# Patient Record
Sex: MALE | Race: WHITE | NOT HISPANIC OR LATINO | Employment: OTHER | URBAN - METROPOLITAN AREA
[De-identification: names, ages, dates, MRNs, and addresses within clinical notes are randomized per-mention and may not be internally consistent; named-entity substitution may affect disease eponyms.]

---

## 2023-05-23 ENCOUNTER — OFFICE VISIT (OUTPATIENT)
Dept: PRIMARY CARE | Facility: CLINIC | Age: 73
End: 2023-05-23
Payer: MEDICARE

## 2023-05-23 VITALS
HEART RATE: 93 BPM | DIASTOLIC BLOOD PRESSURE: 92 MMHG | WEIGHT: 184 LBS | OXYGEN SATURATION: 97 % | BODY MASS INDEX: 24.39 KG/M2 | HEIGHT: 73 IN | SYSTOLIC BLOOD PRESSURE: 150 MMHG | TEMPERATURE: 98 F

## 2023-05-23 DIAGNOSIS — E53.8 VITAMIN B12 DEFICIENCY: ICD-10-CM

## 2023-05-23 DIAGNOSIS — M75.41 IMPINGEMENT SYNDROME, SHOULDER, RIGHT: ICD-10-CM

## 2023-05-23 DIAGNOSIS — K50.919 CROHN'S DISEASE WITH COMPLICATION, UNSPECIFIED GASTROINTESTINAL TRACT LOCATION (MULTI): ICD-10-CM

## 2023-05-23 DIAGNOSIS — E78.9 BORDERLINE HIGH CHOLESTEROL: ICD-10-CM

## 2023-05-23 DIAGNOSIS — Z00.00 MEDICARE ANNUAL WELLNESS VISIT, SUBSEQUENT: Primary | ICD-10-CM

## 2023-05-23 PROBLEM — K50.90 CROHN DISEASE (MULTI): Status: ACTIVE | Noted: 2023-05-23

## 2023-05-23 PROCEDURE — 1170F FXNL STATUS ASSESSED: CPT | Performed by: FAMILY MEDICINE

## 2023-05-23 PROCEDURE — 1159F MED LIST DOCD IN RCRD: CPT | Performed by: FAMILY MEDICINE

## 2023-05-23 PROCEDURE — 99214 OFFICE O/P EST MOD 30 MIN: CPT | Performed by: FAMILY MEDICINE

## 2023-05-23 PROCEDURE — 1160F RVW MEDS BY RX/DR IN RCRD: CPT | Performed by: FAMILY MEDICINE

## 2023-05-23 PROCEDURE — G0439 PPPS, SUBSEQ VISIT: HCPCS | Performed by: FAMILY MEDICINE

## 2023-05-23 PROCEDURE — 1157F ADVNC CARE PLAN IN RCRD: CPT | Performed by: FAMILY MEDICINE

## 2023-05-23 PROCEDURE — 1036F TOBACCO NON-USER: CPT | Performed by: FAMILY MEDICINE

## 2023-05-23 RX ORDER — CYANOCOBALAMIN 1000 UG/ML
INJECTION, SOLUTION INTRAMUSCULAR; SUBCUTANEOUS
COMMUNITY
Start: 2022-05-24

## 2023-05-23 RX ORDER — MULTIVITAMIN
TABLET ORAL
COMMUNITY

## 2023-05-23 RX ORDER — GLUCOSAM/CHONDRO/HERB 149/HYAL 750-100 MG
TABLET ORAL
COMMUNITY

## 2023-05-23 ASSESSMENT — ACTIVITIES OF DAILY LIVING (ADL)
DOING_HOUSEWORK: INDEPENDENT
TAKING_MEDICATION: INDEPENDENT
DRESSING: INDEPENDENT
GROCERY_SHOPPING: INDEPENDENT
MANAGING_FINANCES: INDEPENDENT
BATHING: INDEPENDENT

## 2023-05-23 ASSESSMENT — PATIENT HEALTH QUESTIONNAIRE - PHQ9
SUM OF ALL RESPONSES TO PHQ9 QUESTIONS 1 AND 2: 0
1. LITTLE INTEREST OR PLEASURE IN DOING THINGS: NOT AT ALL
2. FEELING DOWN, DEPRESSED OR HOPELESS: NOT AT ALL

## 2023-05-23 NOTE — PROGRESS NOTES
"Subjective   Reason for Visit: Shayne Walton is an 72 y.o. male here for a Medicare Wellness visit.       HPI  Still having issues  with Shoulder after clearing tree. 2 months ago had a tree and fell in the driveway. 1 week later had pain in the right sholder toward the neck.  Worse in the AM when he wakes.  General activity loosens up.  Has been bothersome.  Swimming stillhurts.  He has taken advil and biofreeeze.  Ice made worsen      Patient Care Team:  Sim Mayberry DO as PCP - General  Sim Mayberry DO as PCP - [employer] PCP     Review of Systems    Objective   Vitals:  BP (!) 150/92   Pulse 93   Temp 36.7 °C (98 °F)   Ht 1.854 m (6' 1\")   Wt 83.5 kg (184 lb)   SpO2 97%   BMI 24.28 kg/m²       Physical Exam  General: Patient is alert and oriented ×3 and appears in no acute distress. No respiratory distress.    Head: Atraumatic normocephalic.    Eyes: EOMI, PERRLA      Ears: Canals patent without any irritation, tympanic membranes without inflammation, no swelling, normal light reflex.    Nose: Nares patent. Turbinates are not swollen. No discharge.    Mouth: Normal mucosa. Moist. No erythema, exudates, tonsillar enlargement.    Neck: Normal range of motion, no masses.  Thyroid is palpable and normal in size without any nodules. No anterior cervical or posterior cervical adenopathy.    Heart: Regular rate and rhythm, no murmurs clicks or gallops    Lungs: Clear to auscultation bilaterally without any rhonchi rales or wheezing, lung sounds heard throughout all lung fields    Abdomen: Soft, nontender, no rigidity, rebound, guarding or organomegaly. Bowel sounds ×4 quadrants.    Musculoskeletal: Normal range of motion, strength is grossly intact in the proximal distal muscles of the upper and lower extremities bilaterally, deep tendon reflexes +2 out of 4 and symmetric bilaterally at the patella, Achilles, biceps, triceps, sensation intact.  Negative empty can test, positive Hernandez sign, " negative Lincoln's test    Nerves: Cranial nerves II through XII appear grossly intact and without deficit    Skin: Intact, dry, no rashes or erythema    Psych: Normal affect.    Assessment/Plan   Problem List Items Addressed This Visit       Crohn disease (CMS/[facility])    Relevant Orders    CBC and Auto Differential    Comprehensive Metabolic Panel    Lipid Panel    Vitamin B12    Vitamin B12 deficiency    Relevant Orders    CBC and Auto Differential    Comprehensive Metabolic Panel    Lipid Panel    Vitamin B12     Other Visit Diagnoses       Medicare annual wellness visit, subsequent    -  Primary    Borderline high cholesterol        Relevant Orders    Lipid Panel    Impingement syndrome, shoulder, right            Patient was given exercises for the shoulder, discussed icing and rest.  If no improvement over the next 4 to 6 weeks we could do joint injection.    Elevated blood pressure  - Discussed DASH diet  - We will have the patient follow-up for blood pressure recheck in 2 weeks

## 2023-06-07 ENCOUNTER — LAB (OUTPATIENT)
Dept: LAB | Facility: LAB | Age: 73
End: 2023-06-07
Payer: MEDICARE

## 2023-06-07 DIAGNOSIS — K50.919 CROHN'S DISEASE WITH COMPLICATION, UNSPECIFIED GASTROINTESTINAL TRACT LOCATION (MULTI): ICD-10-CM

## 2023-06-07 DIAGNOSIS — E53.8 VITAMIN B12 DEFICIENCY: ICD-10-CM

## 2023-06-07 DIAGNOSIS — E78.9 BORDERLINE HIGH CHOLESTEROL: ICD-10-CM

## 2023-06-07 LAB
ALANINE AMINOTRANSFERASE (SGPT) (U/L) IN SER/PLAS: 15 U/L (ref 10–52)
ALBUMIN (G/DL) IN SER/PLAS: 4.1 G/DL (ref 3.4–5)
ALKALINE PHOSPHATASE (U/L) IN SER/PLAS: 78 U/L (ref 33–136)
ANION GAP IN SER/PLAS: 12 MMOL/L (ref 10–20)
ASPARTATE AMINOTRANSFERASE (SGOT) (U/L) IN SER/PLAS: 19 U/L (ref 9–39)
BASOPHILS (10*3/UL) IN BLOOD BY AUTOMATED COUNT: 0.1 X10E9/L (ref 0–0.1)
BASOPHILS/100 LEUKOCYTES IN BLOOD BY AUTOMATED COUNT: 1.2 % (ref 0–2)
BILIRUBIN TOTAL (MG/DL) IN SER/PLAS: 0.5 MG/DL (ref 0–1.2)
CALCIUM (MG/DL) IN SER/PLAS: 9.3 MG/DL (ref 8.6–10.3)
CARBON DIOXIDE, TOTAL (MMOL/L) IN SER/PLAS: 27 MMOL/L (ref 21–32)
CHLORIDE (MMOL/L) IN SER/PLAS: 105 MMOL/L (ref 98–107)
CHOLESTEROL (MG/DL) IN SER/PLAS: 178 MG/DL (ref 0–199)
CHOLESTEROL IN HDL (MG/DL) IN SER/PLAS: 49.6 MG/DL
CHOLESTEROL/HDL RATIO: 3.6
COBALAMIN (VITAMIN B12) (PG/ML) IN SER/PLAS: 1431 PG/ML (ref 211–911)
CREATININE (MG/DL) IN SER/PLAS: 1.19 MG/DL (ref 0.5–1.3)
EOSINOPHILS (10*3/UL) IN BLOOD BY AUTOMATED COUNT: 0.27 X10E9/L (ref 0–0.4)
EOSINOPHILS/100 LEUKOCYTES IN BLOOD BY AUTOMATED COUNT: 3.1 % (ref 0–6)
ERYTHROCYTE DISTRIBUTION WIDTH (RATIO) BY AUTOMATED COUNT: 13.3 % (ref 11.5–14.5)
ERYTHROCYTE MEAN CORPUSCULAR HEMOGLOBIN CONCENTRATION (G/DL) BY AUTOMATED: 31.9 G/DL (ref 32–36)
ERYTHROCYTE MEAN CORPUSCULAR VOLUME (FL) BY AUTOMATED COUNT: 95 FL (ref 80–100)
ERYTHROCYTES (10*6/UL) IN BLOOD BY AUTOMATED COUNT: 4.43 X10E12/L (ref 4.5–5.9)
GFR MALE: 65 ML/MIN/1.73M2
GLUCOSE (MG/DL) IN SER/PLAS: 91 MG/DL (ref 74–99)
HEMATOCRIT (%) IN BLOOD BY AUTOMATED COUNT: 42.3 % (ref 41–52)
HEMOGLOBIN (G/DL) IN BLOOD: 13.5 G/DL (ref 13.5–17.5)
IMMATURE GRANULOCYTES/100 LEUKOCYTES IN BLOOD BY AUTOMATED COUNT: 0.3 % (ref 0–0.9)
LDL: 80 MG/DL (ref 0–99)
LEUKOCYTES (10*3/UL) IN BLOOD BY AUTOMATED COUNT: 8.7 X10E9/L (ref 4.4–11.3)
LYMPHOCYTES (10*3/UL) IN BLOOD BY AUTOMATED COUNT: 1.56 X10E9/L (ref 0.8–3)
LYMPHOCYTES/100 LEUKOCYTES IN BLOOD BY AUTOMATED COUNT: 18 % (ref 13–44)
MONOCYTES (10*3/UL) IN BLOOD BY AUTOMATED COUNT: 0.69 X10E9/L (ref 0.05–0.8)
MONOCYTES/100 LEUKOCYTES IN BLOOD BY AUTOMATED COUNT: 8 % (ref 2–10)
NEUTROPHILS (10*3/UL) IN BLOOD BY AUTOMATED COUNT: 6.01 X10E9/L (ref 1.6–5.5)
NEUTROPHILS/100 LEUKOCYTES IN BLOOD BY AUTOMATED COUNT: 69.4 % (ref 40–80)
NON HDL CHOLESTEROL: 128 MG/DL
PLATELETS (10*3/UL) IN BLOOD AUTOMATED COUNT: 209 X10E9/L (ref 150–450)
POTASSIUM (MMOL/L) IN SER/PLAS: 4 MMOL/L (ref 3.5–5.3)
PROTEIN TOTAL: 6.8 G/DL (ref 6.4–8.2)
SODIUM (MMOL/L) IN SER/PLAS: 140 MMOL/L (ref 136–145)
TRIGLYCERIDE (MG/DL) IN SER/PLAS: 243 MG/DL (ref 0–149)
UREA NITROGEN (MG/DL) IN SER/PLAS: 20 MG/DL (ref 6–23)
VLDL: 49 MG/DL (ref 0–40)

## 2023-06-07 PROCEDURE — 85025 COMPLETE CBC W/AUTO DIFF WBC: CPT

## 2023-06-07 PROCEDURE — 82607 VITAMIN B-12: CPT

## 2023-06-07 PROCEDURE — 80061 LIPID PANEL: CPT

## 2023-06-07 PROCEDURE — 80053 COMPREHEN METABOLIC PANEL: CPT

## 2023-06-07 PROCEDURE — 36415 COLL VENOUS BLD VENIPUNCTURE: CPT

## 2023-06-13 ENCOUNTER — TELEPHONE (OUTPATIENT)
Dept: PRIMARY CARE | Facility: CLINIC | Age: 73
End: 2023-06-13
Payer: COMMERCIAL

## 2023-06-13 NOTE — RESULT ENCOUNTER NOTE
Please let Shayne know that his B12 is in a normal range, cholesterol is very well controlled except for his triglycerides being elevated at 243.  This is more related to breads, pastas, sweets.  Just suggest decreasing those in his diet if he can.  Blood count was normal.  Blood sugar was normal.  Kidney function was normal and liver functions were normal.

## 2023-06-13 NOTE — TELEPHONE ENCOUNTER
----- Message from Sim Mayberry, DO sent at 6/13/2023 12:50 PM EDT -----  Please let Shayne know that his B12 is in a normal range, cholesterol is very well controlled except for his triglycerides being elevated at 243.  This is more related to breads, pastas, sweets.  Just suggest decreasing those in his diet if he can.  Blood count was normal.  Blood sugar was normal.  Kidney function was normal and liver functions were normal.

## 2023-07-05 ENCOUNTER — CLINICAL SUPPORT (OUTPATIENT)
Dept: PRIMARY CARE | Facility: CLINIC | Age: 73
End: 2023-07-05
Payer: COMMERCIAL

## 2023-07-05 VITALS — SYSTOLIC BLOOD PRESSURE: 178 MMHG | DIASTOLIC BLOOD PRESSURE: 86 MMHG

## 2023-07-05 DIAGNOSIS — I10 PRIMARY HYPERTENSION: Primary | ICD-10-CM

## 2023-07-05 RX ORDER — HYDROCHLOROTHIAZIDE 12.5 MG/1
12.5 TABLET ORAL DAILY
Qty: 30 TABLET | Refills: 2 | Status: SHIPPED | OUTPATIENT
Start: 2023-07-05 | End: 2023-09-27

## 2023-08-02 ENCOUNTER — CLINICAL SUPPORT (OUTPATIENT)
Dept: PRIMARY CARE | Facility: CLINIC | Age: 73
End: 2023-08-02
Payer: COMMERCIAL

## 2023-08-02 VITALS — HEART RATE: 87 BPM | DIASTOLIC BLOOD PRESSURE: 86 MMHG | SYSTOLIC BLOOD PRESSURE: 142 MMHG | OXYGEN SATURATION: 97 %

## 2023-08-02 DIAGNOSIS — I10 HYPERTENSION, UNSPECIFIED TYPE: ICD-10-CM

## 2023-08-02 PROBLEM — K29.70 GASTRITIS: Status: ACTIVE | Noted: 2023-08-02

## 2023-08-02 PROBLEM — L02.211 ABDOMINAL WALL ABSCESS: Status: ACTIVE | Noted: 2023-08-02

## 2023-08-02 PROBLEM — H60.93 BILATERAL OTITIS EXTERNA: Status: ACTIVE | Noted: 2023-08-02

## 2023-08-02 PROBLEM — H61.893 IRRITATION OF BOTH EXTERNAL AUDITORY CANALS: Status: ACTIVE | Noted: 2023-08-02

## 2023-08-02 PROBLEM — R10.9 ABDOMINAL PAIN, ACUTE: Status: ACTIVE | Noted: 2023-08-02

## 2023-08-02 PROBLEM — B02.9 SHINGLES: Status: ACTIVE | Noted: 2023-08-02

## 2023-08-02 PROBLEM — J01.90 ACUTE SINUSITIS: Status: ACTIVE | Noted: 2023-08-02

## 2023-09-07 ENCOUNTER — CLINICAL SUPPORT (OUTPATIENT)
Dept: PRIMARY CARE | Facility: CLINIC | Age: 73
End: 2023-09-07
Payer: COMMERCIAL

## 2023-09-07 VITALS — DIASTOLIC BLOOD PRESSURE: 98 MMHG | SYSTOLIC BLOOD PRESSURE: 160 MMHG

## 2023-09-07 DIAGNOSIS — I10 HYPERTENSION, UNSPECIFIED TYPE: ICD-10-CM

## 2023-09-12 ENCOUNTER — TELEPHONE (OUTPATIENT)
Dept: PRIMARY CARE | Facility: CLINIC | Age: 73
End: 2023-09-12
Payer: COMMERCIAL

## 2023-09-12 DIAGNOSIS — I10 HYPERTENSION, UNSPECIFIED TYPE: Primary | ICD-10-CM

## 2023-09-12 RX ORDER — AMLODIPINE BESYLATE 5 MG/1
5 TABLET ORAL DAILY
Qty: 30 TABLET | Refills: 5 | Status: SHIPPED | OUTPATIENT
Start: 2023-09-12 | End: 2023-12-12 | Stop reason: SDUPTHER

## 2023-09-25 DIAGNOSIS — I10 PRIMARY HYPERTENSION: ICD-10-CM

## 2023-09-27 RX ORDER — HYDROCHLOROTHIAZIDE 12.5 MG/1
12.5 TABLET ORAL DAILY
Qty: 30 TABLET | Refills: 2 | Status: SHIPPED | OUTPATIENT
Start: 2023-09-27 | End: 2023-11-20

## 2023-11-18 DIAGNOSIS — I10 PRIMARY HYPERTENSION: ICD-10-CM

## 2023-11-20 RX ORDER — HYDROCHLOROTHIAZIDE 12.5 MG/1
12.5 TABLET ORAL DAILY
Qty: 90 TABLET | Refills: 1 | Status: SHIPPED | OUTPATIENT
Start: 2023-11-20 | End: 2023-12-12 | Stop reason: SDUPTHER

## 2023-12-12 DIAGNOSIS — I10 HYPERTENSION, UNSPECIFIED TYPE: ICD-10-CM

## 2023-12-12 DIAGNOSIS — I10 PRIMARY HYPERTENSION: ICD-10-CM

## 2023-12-12 RX ORDER — HYDROCHLOROTHIAZIDE 12.5 MG/1
12.5 TABLET ORAL DAILY
Qty: 90 TABLET | Refills: 3 | Status: SHIPPED | OUTPATIENT
Start: 2023-12-12

## 2023-12-12 RX ORDER — AMLODIPINE BESYLATE 5 MG/1
5 TABLET ORAL DAILY
Qty: 90 TABLET | Refills: 3 | Status: SHIPPED | OUTPATIENT
Start: 2023-12-12 | End: 2024-12-06

## 2024-01-02 ENCOUNTER — APPOINTMENT (OUTPATIENT)
Dept: PRIMARY CARE | Facility: CLINIC | Age: 74
End: 2024-01-02
Payer: MEDICARE

## 2024-01-08 ENCOUNTER — APPOINTMENT (OUTPATIENT)
Dept: PRIMARY CARE | Facility: CLINIC | Age: 74
End: 2024-01-08
Payer: MEDICARE

## 2024-05-23 ENCOUNTER — APPOINTMENT (OUTPATIENT)
Dept: PRIMARY CARE | Facility: CLINIC | Age: 74
End: 2024-05-23
Payer: MEDICARE

## 2024-06-18 ENCOUNTER — APPOINTMENT (OUTPATIENT)
Dept: PRIMARY CARE | Facility: CLINIC | Age: 74
End: 2024-06-18
Payer: MEDICARE

## 2024-09-05 ENCOUNTER — PATIENT MESSAGE (OUTPATIENT)
Dept: PRIMARY CARE | Facility: CLINIC | Age: 74
End: 2024-09-05
Payer: MEDICARE

## 2024-09-05 DIAGNOSIS — E78.9 BORDERLINE HIGH CHOLESTEROL: ICD-10-CM

## 2024-09-05 DIAGNOSIS — E55.9 VITAMIN D DEFICIENCY: Primary | ICD-10-CM

## 2024-09-05 DIAGNOSIS — E53.8 VITAMIN B12 DEFICIENCY: ICD-10-CM

## 2024-09-05 DIAGNOSIS — I10 HYPERTENSION, UNSPECIFIED TYPE: ICD-10-CM

## 2024-09-07 ENCOUNTER — APPOINTMENT (OUTPATIENT)
Dept: PRIMARY CARE | Facility: CLINIC | Age: 74
End: 2024-09-07
Payer: MEDICARE

## 2024-09-17 ENCOUNTER — LAB (OUTPATIENT)
Dept: LAB | Facility: LAB | Age: 74
End: 2024-09-17
Payer: MEDICARE

## 2024-09-17 DIAGNOSIS — I10 HYPERTENSION, UNSPECIFIED TYPE: ICD-10-CM

## 2024-09-17 DIAGNOSIS — E78.9 BORDERLINE HIGH CHOLESTEROL: ICD-10-CM

## 2024-09-17 DIAGNOSIS — E53.8 VITAMIN B12 DEFICIENCY: ICD-10-CM

## 2024-09-17 DIAGNOSIS — E55.9 VITAMIN D DEFICIENCY: ICD-10-CM

## 2024-09-17 LAB
25(OH)D3 SERPL-MCNC: 10 NG/ML (ref 30–100)
ALBUMIN SERPL BCP-MCNC: 4.1 G/DL (ref 3.4–5)
ALP SERPL-CCNC: 89 U/L (ref 33–136)
ALT SERPL W P-5'-P-CCNC: 12 U/L (ref 10–52)
ANION GAP SERPL CALC-SCNC: 10 MMOL/L (ref 10–20)
AST SERPL W P-5'-P-CCNC: 18 U/L (ref 9–39)
BASOPHILS # BLD AUTO: 0.1 X10*3/UL (ref 0–0.1)
BASOPHILS NFR BLD AUTO: 1.3 %
BILIRUB SERPL-MCNC: 0.5 MG/DL (ref 0–1.2)
BUN SERPL-MCNC: 20 MG/DL (ref 6–23)
CALCIUM SERPL-MCNC: 8.8 MG/DL (ref 8.6–10.3)
CHLORIDE SERPL-SCNC: 106 MMOL/L (ref 98–107)
CHOLEST SERPL-MCNC: 198 MG/DL (ref 0–199)
CHOLESTEROL/HDL RATIO: 4.3
CO2 SERPL-SCNC: 28 MMOL/L (ref 21–32)
CREAT SERPL-MCNC: 1.37 MG/DL (ref 0.5–1.3)
EGFRCR SERPLBLD CKD-EPI 2021: 54 ML/MIN/1.73M*2
EOSINOPHIL # BLD AUTO: 0.32 X10*3/UL (ref 0–0.4)
EOSINOPHIL NFR BLD AUTO: 4 %
ERYTHROCYTE [DISTWIDTH] IN BLOOD BY AUTOMATED COUNT: 13.5 % (ref 11.5–14.5)
GLUCOSE SERPL-MCNC: 102 MG/DL (ref 74–99)
HCT VFR BLD AUTO: 42 % (ref 41–52)
HDLC SERPL-MCNC: 46.2 MG/DL
HGB BLD-MCNC: 13.7 G/DL (ref 13.5–17.5)
IMM GRANULOCYTES # BLD AUTO: 0.02 X10*3/UL (ref 0–0.5)
IMM GRANULOCYTES NFR BLD AUTO: 0.3 % (ref 0–0.9)
LDLC SERPL CALC-MCNC: 99 MG/DL
LYMPHOCYTES # BLD AUTO: 1.44 X10*3/UL (ref 0.8–3)
LYMPHOCYTES NFR BLD AUTO: 18.1 %
MCH RBC QN AUTO: 30.7 PG (ref 26–34)
MCHC RBC AUTO-ENTMCNC: 32.6 G/DL (ref 32–36)
MCV RBC AUTO: 94 FL (ref 80–100)
MONOCYTES # BLD AUTO: 0.68 X10*3/UL (ref 0.05–0.8)
MONOCYTES NFR BLD AUTO: 8.6 %
NEUTROPHILS # BLD AUTO: 5.39 X10*3/UL (ref 1.6–5.5)
NEUTROPHILS NFR BLD AUTO: 67.7 %
NON HDL CHOLESTEROL: 152 MG/DL (ref 0–149)
NRBC BLD-RTO: 0 /100 WBCS (ref 0–0)
PLATELET # BLD AUTO: 214 X10*3/UL (ref 150–450)
POTASSIUM SERPL-SCNC: 4.5 MMOL/L (ref 3.5–5.3)
PROT SERPL-MCNC: 6.7 G/DL (ref 6.4–8.2)
RBC # BLD AUTO: 4.46 X10*6/UL (ref 4.5–5.9)
SODIUM SERPL-SCNC: 139 MMOL/L (ref 136–145)
TRIGL SERPL-MCNC: 263 MG/DL (ref 0–149)
VIT B12 SERPL-MCNC: 307 PG/ML (ref 211–911)
VLDL: 53 MG/DL (ref 0–40)
WBC # BLD AUTO: 8 X10*3/UL (ref 4.4–11.3)

## 2024-09-17 PROCEDURE — 80053 COMPREHEN METABOLIC PANEL: CPT

## 2024-09-17 PROCEDURE — 82607 VITAMIN B-12: CPT

## 2024-09-17 PROCEDURE — 82306 VITAMIN D 25 HYDROXY: CPT

## 2024-09-17 PROCEDURE — 36415 COLL VENOUS BLD VENIPUNCTURE: CPT

## 2024-09-17 PROCEDURE — 80061 LIPID PANEL: CPT

## 2024-09-17 PROCEDURE — 85025 COMPLETE CBC W/AUTO DIFF WBC: CPT

## 2024-09-25 ENCOUNTER — APPOINTMENT (OUTPATIENT)
Dept: PRIMARY CARE | Facility: CLINIC | Age: 74
End: 2024-09-25
Payer: MEDICARE

## 2024-09-25 VITALS
RESPIRATION RATE: 16 BRPM | HEIGHT: 73 IN | BODY MASS INDEX: 23.46 KG/M2 | HEART RATE: 92 BPM | WEIGHT: 177 LBS | DIASTOLIC BLOOD PRESSURE: 102 MMHG | SYSTOLIC BLOOD PRESSURE: 168 MMHG

## 2024-09-25 DIAGNOSIS — K50.919 CROHN'S DISEASE WITH COMPLICATION, UNSPECIFIED GASTROINTESTINAL TRACT LOCATION: ICD-10-CM

## 2024-09-25 DIAGNOSIS — N18.31 CKD STAGE 3A, GFR 45-59 ML/MIN (MULTI): ICD-10-CM

## 2024-09-25 DIAGNOSIS — E78.9 BORDERLINE HIGH CHOLESTEROL: ICD-10-CM

## 2024-09-25 DIAGNOSIS — Z00.00 ANNUAL PHYSICAL EXAM: ICD-10-CM

## 2024-09-25 DIAGNOSIS — Z00.00 MEDICARE ANNUAL WELLNESS VISIT, SUBSEQUENT: Primary | ICD-10-CM

## 2024-09-25 DIAGNOSIS — E55.9 VITAMIN D DEFICIENCY: ICD-10-CM

## 2024-09-25 DIAGNOSIS — I10 HYPERTENSION, UNSPECIFIED TYPE: ICD-10-CM

## 2024-09-25 DIAGNOSIS — E53.8 VITAMIN B12 DEFICIENCY: ICD-10-CM

## 2024-09-25 DIAGNOSIS — M25.561 ACUTE PAIN OF RIGHT KNEE: ICD-10-CM

## 2024-09-25 PROCEDURE — 3080F DIAST BP >= 90 MM HG: CPT | Performed by: FAMILY MEDICINE

## 2024-09-25 PROCEDURE — 99397 PER PM REEVAL EST PAT 65+ YR: CPT | Performed by: FAMILY MEDICINE

## 2024-09-25 PROCEDURE — G0439 PPPS, SUBSEQ VISIT: HCPCS | Performed by: FAMILY MEDICINE

## 2024-09-25 PROCEDURE — 1123F ACP DISCUSS/DSCN MKR DOCD: CPT | Performed by: FAMILY MEDICINE

## 2024-09-25 PROCEDURE — 1158F ADVNC CARE PLAN TLK DOCD: CPT | Performed by: FAMILY MEDICINE

## 2024-09-25 PROCEDURE — 1170F FXNL STATUS ASSESSED: CPT | Performed by: FAMILY MEDICINE

## 2024-09-25 PROCEDURE — 3008F BODY MASS INDEX DOCD: CPT | Performed by: FAMILY MEDICINE

## 2024-09-25 PROCEDURE — 3077F SYST BP >= 140 MM HG: CPT | Performed by: FAMILY MEDICINE

## 2024-09-25 PROCEDURE — 1157F ADVNC CARE PLAN IN RCRD: CPT | Performed by: FAMILY MEDICINE

## 2024-09-25 PROCEDURE — 1036F TOBACCO NON-USER: CPT | Performed by: FAMILY MEDICINE

## 2024-09-25 RX ORDER — ERGOCALCIFEROL 1.25 MG/1
1.25 CAPSULE ORAL
Qty: 12 CAPSULE | Refills: 3 | Status: SHIPPED | OUTPATIENT
Start: 2024-09-29 | End: 2025-03-28

## 2024-09-25 RX ORDER — LOSARTAN POTASSIUM AND HYDROCHLOROTHIAZIDE 12.5; 1 MG/1; MG/1
1 TABLET ORAL DAILY
Qty: 30 TABLET | Refills: 11 | Status: SHIPPED | OUTPATIENT
Start: 2024-09-25 | End: 2025-09-25

## 2024-09-25 ASSESSMENT — ACTIVITIES OF DAILY LIVING (ADL)
GROCERY_SHOPPING: INDEPENDENT
DOING_HOUSEWORK: INDEPENDENT
DRESSING: INDEPENDENT
BATHING: INDEPENDENT
MANAGING_FINANCES: INDEPENDENT
TAKING_MEDICATION: INDEPENDENT

## 2024-09-25 NOTE — PROGRESS NOTES
"Subjective   Reason for Visit: Shayne Walton is an 73 y.o. male here for a Medicare Wellness visit and Annual physical exam      HPI  Essential hypertension  - The patient's blood pressure was elevated at 168/102.  Denies any headaches, change in vision, chest pain, shortness breath, lightheadedness, dizziness, passing out or swelling in the legs.  States that salt may be an issue with his diet.  Has been taking hydrochlorothiazide.  We have prescribed amlodipine but he did not take this due to him not wanting to take more than 1 pill.  We discussed the risks of uncontrolled hypertension including the risk of heart attack and stroke.  He is aware of these and would like to do more to control it    Vitamin D deficiency  - Patient's vitamin D was 10.  He is taking a low-dose vitamin D at this time    In general the patient states that his health is:  good     Regular dental visits:  regular  Vision problems: No recent changes  Hearing loss: hearing aides    Diet: Well balanced  Exercise: Regualr  Weight concerns:none  Sleep: Good  Caffeine: 3 cups daily  Water: several glasses daily    Reproductive health:  Urinary difficulties: Nocturia 2 x night.  No issues with urination during the day    Colon cancer screening:  Last done  Colonoscopy       Patient Care Team:  Sim Mayberry DO as PCP - General  Sim Mayberry DO as PCP - [employer] PCP     Review of Systems    Objective   Vitals:  BP (!) 168/102 (BP Location: Right arm, Patient Position: Sitting, BP Cuff Size: Adult)   Pulse 92   Resp 16   Ht 1.854 m (6' 1\")   Wt 80.3 kg (177 lb)   BMI 23.35 kg/m²       Physical Exam  General: Patient is alert and oriented ×3 and appears in no acute distress. No respiratory distress.    Head: Atraumatic normocephalic.    Eyes: EOMI, PERRLA      Ears: Canals patent without any irritation, tympanic membranes without inflammation, no swelling, normal light reflex.    Nose: Nares patent. Turbinates are not " swollen. No discharge.    Mouth: Normal mucosa. Moist. No erythema, exudates, tonsillar enlargement.    Neck: Normal range of motion, no masses.  Thyroid is palpable and normal in size without any nodules. No anterior cervical or posterior cervical adenopathy.    Heart: Regular rate and rhythm, no murmurs clicks or gallops    Lungs: Clear to auscultation bilaterally without any rhonchi rales or wheezing, lung sounds heard throughout all lung fields    Abdomen: Soft, nontender, no rigidity, rebound, guarding or organomegaly. Bowel sounds ×4 quadrants.    Musculoskeletal: Normal range of motion, strength is grossly intact in the proximal distal muscles of the upper and lower extremities bilaterally, deep tendon reflexes +2 out of 4 and symmetric bilaterally at the patella, Achilles, biceps, triceps, sensation intact.  Tenderness over the medial aspect of the right knee    Nerves: Cranial nerves II through XII appear grossly intact and without deficit    Skin: Intact, dry, no rashes or erythema    Psych: Normal affect.    Assessment/Plan   Problem List Items Addressed This Visit       Crohn disease (Multi)    Vitamin B12 deficiency     Other Visit Diagnoses       Medicare annual wellness visit, subsequent    -  Primary    Annual physical exam        Hypertension, unspecified type        Relevant Medications    losartan-hydrochlorothiazide (Hyzaar) 100-12.5 mg tablet    Other Relevant Orders    Comprehensive Metabolic Panel    Borderline high cholesterol        Relevant Orders    Lipid Panel    Vitamin D deficiency        Relevant Medications    ergocalciferol (Vitamin D-2) 1.25 MG (63698 UT) capsule (Start on 9/29/2024)    Other Relevant Orders    Vitamin D 25-Hydroxy,Total (for eval of Vitamin D levels)    CKD stage 3a, GFR 45-59 ml/min (Multi)        Relevant Medications    losartan-hydrochlorothiazide (Hyzaar) 100-12.5 mg tablet    Other Relevant Orders    Comprehensive Metabolic Panel    Acute pain of right knee         Relevant Orders    XR knee right 4+ views        Reviewed in for the patient's past medical history, surgical history, family history, social history  Depression screening was done in the office today using PHQ-2Memory  was checked using Mini-Mental Status exam today.  Patient scored 30 out of 30  We reviewed the patient's activities of daily living and possible risk for falling.  Patient is stable.  Discussed preventative screenings  Vaccinations were discussed in the office.  We did review the patient's status on influenza vaccination, pneumonia vaccination, tetanus vaccination, and refuses vaccination  Patient was instructed to follow-up with dentist regularly and also with eye doctor regularly  We discussed advanced directives including power of , living well and DO NOT RESUSCITATE orders and we have them    Essential Hypertension  - Decrease salt in diet  -Started losartan hydrochlorothiazide 100/12.5 mg 1 p.o. daily.  - Follow-up in 2-4 weeks for blood pressure check    Vitamin D deficiency  - Started vitamin D 50,000 IUs once a week and he should take this with food.  Will repeat labs in 6 months    CKD stage IIIa  - Discussed that blood pressure control is important and we are working on that  - Patient should continue to try and stay hydrated  - Consider Jardiance or fark [employer] in the future    Hypertriglyceridemia  - Patient will be taking 4 capsules of fish oil daily

## 2024-09-25 NOTE — PATIENT INSTRUCTIONS
Essential Hypertension  - Decrease salt in diet  -Started losartan hydrochlorothiazide 100/12.5 mg 1 p.o. daily.  - Follow-up in 2-4 weeks for blood pressure check    Vitamin D deficiency  - Started vitamin D 50,000 IUs once a week and he should take this with food.  Will repeat labs in 6 months    CKD stage IIIa  - Discussed that blood pressure control is important and we are working on that  - Patient should continue to try and stay hydrated  - Consider Jardiance or fark [employer] in the future    Hypertriglyceridemia  - Patient will be taking 4 capsules of fish oil daily

## 2024-10-21 ENCOUNTER — HOSPITAL ENCOUNTER (OUTPATIENT)
Dept: RADIOLOGY | Facility: HOSPITAL | Age: 74
Discharge: HOME | End: 2024-10-21
Payer: MEDICARE

## 2024-10-21 DIAGNOSIS — M25.561 ACUTE PAIN OF RIGHT KNEE: ICD-10-CM

## 2024-10-21 PROCEDURE — 73564 X-RAY EXAM KNEE 4 OR MORE: CPT | Mod: RT

## 2024-10-21 PROCEDURE — 73564 X-RAY EXAM KNEE 4 OR MORE: CPT | Mod: RIGHT SIDE | Performed by: RADIOLOGY

## 2024-10-22 ENCOUNTER — APPOINTMENT (OUTPATIENT)
Dept: PRIMARY CARE | Facility: CLINIC | Age: 74
End: 2024-10-22
Payer: MEDICARE

## 2024-10-23 ENCOUNTER — APPOINTMENT (OUTPATIENT)
Dept: PRIMARY CARE | Facility: CLINIC | Age: 74
End: 2024-10-23
Payer: MEDICARE

## 2024-10-23 VITALS
OXYGEN SATURATION: 98 % | HEART RATE: 89 BPM | WEIGHT: 177 LBS | SYSTOLIC BLOOD PRESSURE: 158 MMHG | DIASTOLIC BLOOD PRESSURE: 104 MMHG | BODY MASS INDEX: 23.46 KG/M2 | HEIGHT: 73 IN

## 2024-10-23 DIAGNOSIS — I10 HYPERTENSION, UNSPECIFIED TYPE: ICD-10-CM

## 2024-10-23 DIAGNOSIS — I10 HYPERTENSION, UNSPECIFIED TYPE: Primary | ICD-10-CM

## 2024-10-23 PROCEDURE — 99211 OFF/OP EST MAY X REQ PHY/QHP: CPT | Performed by: FAMILY MEDICINE

## 2024-10-23 RX ORDER — OLMESARTAN MEDOXOMIL, AMLODIPINE AND HYDROCHLOROTHIAZIDE TABLET 40/5/12.5 MG 40; 5; 12.5 MG/1; MG/1; MG/1
1 TABLET ORAL DAILY
Qty: 30 TABLET | Refills: 3 | Status: SHIPPED | OUTPATIENT
Start: 2024-10-23 | End: 2024-11-22

## 2024-10-23 NOTE — Clinical Note
Shayne came in today for a blood pressure check it was high 158/104 left arm after sitting for 5 minutes. Pulse 89 and oxygen 98. Dr. Mayberry talked to him and said he was gonna adjust his meds and to come back in for a blood pressure check in 2 wks.

## 2024-10-24 ENCOUNTER — TELEPHONE (OUTPATIENT)
Dept: PRIMARY CARE | Facility: CLINIC | Age: 74
End: 2024-10-24
Payer: MEDICARE

## 2024-10-24 NOTE — TELEPHONE ENCOUNTER
----- Message from Sim Mayberry sent at 10/24/2024  6:45 AM EDT -----  Please let Shayne know that he has advanced arthritis of the right knee.  We could do a joint injection if he is interested

## 2024-10-24 NOTE — RESULT ENCOUNTER NOTE
Please let Shayne know that he has advanced arthritis of the right knee.  We could do a joint injection if he is interested

## 2024-10-28 DIAGNOSIS — N18.31 CKD STAGE 3A, GFR 45-59 ML/MIN (MULTI): ICD-10-CM

## 2024-10-28 DIAGNOSIS — I10 HYPERTENSION, UNSPECIFIED TYPE: ICD-10-CM

## 2024-10-28 RX ORDER — LOSARTAN POTASSIUM AND HYDROCHLOROTHIAZIDE 12.5; 1 MG/1; MG/1
1 TABLET ORAL DAILY
Qty: 30 TABLET | Refills: 11 | Status: SHIPPED | OUTPATIENT
Start: 2024-10-28 | End: 2025-10-28

## 2024-11-06 ENCOUNTER — APPOINTMENT (OUTPATIENT)
Dept: PRIMARY CARE | Facility: CLINIC | Age: 74
End: 2024-11-06
Payer: MEDICARE

## 2025-04-16 ENCOUNTER — APPOINTMENT (OUTPATIENT)
Dept: PRIMARY CARE | Facility: CLINIC | Age: 75
End: 2025-04-16
Payer: MEDICARE

## 2025-04-16 VITALS
RESPIRATION RATE: 16 BRPM | SYSTOLIC BLOOD PRESSURE: 150 MMHG | BODY MASS INDEX: 23.72 KG/M2 | HEIGHT: 73 IN | DIASTOLIC BLOOD PRESSURE: 98 MMHG | OXYGEN SATURATION: 96 % | WEIGHT: 179 LBS | HEART RATE: 95 BPM

## 2025-04-16 DIAGNOSIS — N18.31 CKD STAGE 3A, GFR 45-59 ML/MIN (MULTI): ICD-10-CM

## 2025-04-16 DIAGNOSIS — E78.9 BORDERLINE HIGH CHOLESTEROL: ICD-10-CM

## 2025-04-16 DIAGNOSIS — N39.498: ICD-10-CM

## 2025-04-16 DIAGNOSIS — N13.9 LOWER URINARY OBSTRUCTIVE SYMPTOM: ICD-10-CM

## 2025-04-16 DIAGNOSIS — Z12.11 SCREENING FOR MALIGNANT NEOPLASM OF COLON: ICD-10-CM

## 2025-04-16 DIAGNOSIS — E55.9 VITAMIN D DEFICIENCY: ICD-10-CM

## 2025-04-16 DIAGNOSIS — K50.919 CROHN'S DISEASE WITH COMPLICATION, UNSPECIFIED GASTROINTESTINAL TRACT LOCATION: ICD-10-CM

## 2025-04-16 DIAGNOSIS — I10 HYPERTENSION, UNSPECIFIED TYPE: ICD-10-CM

## 2025-04-16 DIAGNOSIS — Z12.11 ENCOUNTER FOR SCREENING FOR MALIGNANT NEOPLASM OF COLON: ICD-10-CM

## 2025-04-16 DIAGNOSIS — Z00.00 MEDICARE ANNUAL WELLNESS VISIT, SUBSEQUENT: Primary | ICD-10-CM

## 2025-04-16 DIAGNOSIS — E53.8 VITAMIN B12 DEFICIENCY: ICD-10-CM

## 2025-04-16 PROCEDURE — G0439 PPPS, SUBSEQ VISIT: HCPCS | Performed by: FAMILY MEDICINE

## 2025-04-16 PROCEDURE — 3080F DIAST BP >= 90 MM HG: CPT | Performed by: FAMILY MEDICINE

## 2025-04-16 PROCEDURE — 1160F RVW MEDS BY RX/DR IN RCRD: CPT | Performed by: FAMILY MEDICINE

## 2025-04-16 PROCEDURE — 1036F TOBACCO NON-USER: CPT | Performed by: FAMILY MEDICINE

## 2025-04-16 PROCEDURE — 3077F SYST BP >= 140 MM HG: CPT | Performed by: FAMILY MEDICINE

## 2025-04-16 PROCEDURE — 3008F BODY MASS INDEX DOCD: CPT | Performed by: FAMILY MEDICINE

## 2025-04-16 PROCEDURE — 99214 OFFICE O/P EST MOD 30 MIN: CPT | Performed by: FAMILY MEDICINE

## 2025-04-16 PROCEDURE — 1123F ACP DISCUSS/DSCN MKR DOCD: CPT | Performed by: FAMILY MEDICINE

## 2025-04-16 PROCEDURE — 1157F ADVNC CARE PLAN IN RCRD: CPT | Performed by: FAMILY MEDICINE

## 2025-04-16 PROCEDURE — 1170F FXNL STATUS ASSESSED: CPT | Performed by: FAMILY MEDICINE

## 2025-04-16 PROCEDURE — 1159F MED LIST DOCD IN RCRD: CPT | Performed by: FAMILY MEDICINE

## 2025-04-16 RX ORDER — OLMESARTAN MEDOXOMIL, AMLODIPINE AND HYDROCHLOROTHIAZIDE TABLET 40/5/12.5 MG 40; 5; 12.5 MG/1; MG/1; MG/1
1 TABLET ORAL DAILY
Qty: 30 TABLET | Refills: 3 | Status: SHIPPED | OUTPATIENT
Start: 2025-04-16

## 2025-04-16 ASSESSMENT — ENCOUNTER SYMPTOMS
HYPERTENSION: 1
BLURRED VISION: 1

## 2025-04-16 ASSESSMENT — PATIENT HEALTH QUESTIONNAIRE - PHQ9
1. LITTLE INTEREST OR PLEASURE IN DOING THINGS: NOT AT ALL
SUM OF ALL RESPONSES TO PHQ9 QUESTIONS 1 AND 2: 0
2. FEELING DOWN, DEPRESSED OR HOPELESS: NOT AT ALL

## 2025-04-16 ASSESSMENT — ACTIVITIES OF DAILY LIVING (ADL)
MANAGING_FINANCES: INDEPENDENT
GROCERY_SHOPPING: INDEPENDENT
DRESSING: INDEPENDENT
TAKING_MEDICATION: INDEPENDENT
BATHING: INDEPENDENT
DOING_HOUSEWORK: INDEPENDENT

## 2025-04-16 NOTE — PROGRESS NOTES
"Subjective   Reason for Visit: Shayne Walton is an 74 y.o. male here for a Medicare Wellness visit and Annual physical exam      Hypertension  This is a chronic problem. The current episode started in the past 7 days. The problem is unchanged. The problem is resistant. Associated symptoms include anxiety, blurred vision and malaise/fatigue. There are no associated agents to hypertension. There are no known risk factors for coronary artery disease. There are no compliance problems.      Essential hypertension  - The patient's blood pressure was elevated at 150/98.  Denies any headaches, change in vision, chest pain, shortness breath, lightheadedness, dizziness, passing out or swelling in the legs.  States that salt may be an issue with his diet.  Has been taking losartan hydrochlorothiazide.  We discussed the risks of uncontrolled hypertension including the risk of heart attack and stroke.  He is aware of these and would like to do more to control it    Last 6 months weak stream with urination.  No pain or discomfort.      Vitamin D deficiency  - Patient's vitamin D was 10.  He is taking it and needs repeated    In general the patient states that his health is:  good     Regular dental visits:  regular  Vision problems: No recent changes  Hearing loss: hearing aides    Diet: Well balanced  Exercise: Regualr  Weight concerns:none  Sleep: Good  Caffeine: 3 cups daily  Water: several glasses daily    Reproductive health:  Urinary difficulties: Nocturia 2 x night.     Colon cancer screening:  Last done  Colonoscopy   2019    Patient Care Team:  Sim Mayberry DO as PCP - General  Sim Mayberry DO as PCP - [employer] PCP     Review of Systems   Constitutional:  Positive for malaise/fatigue.   Eyes:  Positive for blurred vision.       Objective   Vitals:  BP (!) 150/98 (BP Location: Left arm, Patient Position: Sitting, BP Cuff Size: Adult)   Pulse 95   Resp 16   Ht 1.854 m (6' 1\")   Wt 81.2 kg (179 " lb)   SpO2 96%   BMI 23.62 kg/m²       Physical Exam  General: Patient is alert and oriented ×3 and appears in no acute distress. No respiratory distress.    Head: Atraumatic normocephalic.    Eyes: EOMI, PERRLA      Ears: Canals patent without any irritation, tympanic membranes without inflammation, no swelling, normal light reflex.    Nose: Nares patent. Turbinates are not swollen. No discharge.    Mouth: Normal mucosa. Moist. No erythema, exudates, tonsillar enlargement.    Neck: Normal range of motion, no masses.  Thyroid is palpable and normal in size without any nodules. No anterior cervical or posterior cervical adenopathy.    Heart: Regular rate and rhythm, no murmurs clicks or gallops    Lungs: Clear to auscultation bilaterally without any rhonchi rales or wheezing, lung sounds heard throughout all lung fields    Abdomen: Soft, nontender, no rigidity, rebound, guarding or organomegaly. Bowel sounds ×4 quadrants.    Musculoskeletal: Normal range of motion, strength is grossly intact in the proximal distal muscles of the upper and lower extremities bilaterally, deep tendon reflexes +2 out of 4 and symmetric bilaterally at the patella, Achilles, biceps, triceps, sensation intact.  Tenderness over the medial aspect of the right knee    Nerves: Cranial nerves II through XII appear grossly intact and without deficit    Skin: Intact, dry, no rashes or erythema    Psych: Normal affect.    Assessment/Plan   Problem List Items Addressed This Visit       Crohn disease (Multi)    Relevant Orders    Referral to Gastroenterology    Vitamin B12 deficiency     Other Visit Diagnoses         Medicare annual wellness visit, subsequent    -  Primary      Hypertension, unspecified type        Relevant Medications    olmesartan-amLODIPin-hcthiazid 40-5-12.5 mg tablet    Other Relevant Orders    CBC and Auto Differential    Comprehensive Metabolic Panel    Lipid Panel    Vitamin B12    Albumin-Creatinine Ratio, Urine Random     Albumin-Creatinine Ratio, Urine Random      Vitamin D deficiency        Relevant Orders    Vitamin D 25-Hydroxy,Total (for eval of Vitamin D levels)      Borderline high cholesterol        Relevant Orders    CBC and Auto Differential    Comprehensive Metabolic Panel    Lipid Panel    Vitamin B12    Albumin-Creatinine Ratio, Urine Random    Albumin-Creatinine Ratio, Urine Random      CKD stage 3a, GFR 45-59 ml/min (Multi)        Relevant Orders    Albumin-Creatinine Ratio, Urine Random      Postmenopausal          Other specified elimination disorder, with urinary symptoms          Encounter for screening for malignant neoplasm of colon          Lower urinary obstructive symptom        Relevant Orders    PSA      Screening for malignant neoplasm of colon        Relevant Orders    Referral to Gastroenterology          Reviewed in for the patient's past medical history, surgical history, family history, social history  Depression screening was done in the office today using PHQ-2Memory  was checked using Mini-Mental Status exam today.  Patient scored 30 out of 30  We reviewed the patient's activities of daily living and possible risk for falling.  Patient is stable.  Discussed preventative screenings  Vaccinations were discussed in the office.  We did review the patient's status on influenza vaccination, pneumonia vaccination, tetanus vaccination, and refuses vaccination  Patient was instructed to follow-up with dentist regularly and also with eye doctor regularly  We discussed advanced directives including power of , living well and DO NOT RESUSCITATE orders and we have them    Essential Hypertension  - Decrease salt in diet  -Stopped losartan hydrochlorothiazide 100/12.5 mg 1 p.o. daily.  - Started Olmesartan-amlodipine- hydrochlorothiazide 40-5-12.5 1 po daily  - Follow-up in 6 weeks for blood pressure check    Vitamin D deficiency  - vitamin D 50,000 IUs once a week and he should take this with food.  Will  repeat labs     CKD stage IIIa  - Discussed that blood pressure control is important and we are working on that  - Patient should continue to try and stay hydrated  - Consider Jardiance or Farxiga in the future    Hypertriglyceridemia  - Patient will be taking 4 capsules of fish oil daily    Osteoarthritis Knee  - discussed knee pain    Colon Cancer screening  - sent to Dr Li    Urinary symptoms  - PSA

## 2025-04-30 ENCOUNTER — PATIENT MESSAGE (OUTPATIENT)
Dept: PRIMARY CARE | Facility: CLINIC | Age: 75
End: 2025-04-30
Payer: MEDICARE

## 2025-04-30 DIAGNOSIS — I10 HYPERTENSION, UNSPECIFIED TYPE: Primary | ICD-10-CM

## 2025-04-30 RX ORDER — LOSARTAN POTASSIUM AND HYDROCHLOROTHIAZIDE 25; 100 MG/1; MG/1
1 TABLET ORAL DAILY
Qty: 90 TABLET | Refills: 3 | Status: SHIPPED | OUTPATIENT
Start: 2025-04-30 | End: 2026-04-30

## 2025-05-23 LAB
25(OH)D3+25(OH)D2 SERPL-MCNC: 50 NG/ML (ref 30–100)
ALBUMIN SERPL-MCNC: 4.4 G/DL (ref 3.6–5.1)
ALP SERPL-CCNC: 87 U/L (ref 35–144)
ALT SERPL-CCNC: 14 U/L (ref 9–46)
ANION GAP SERPL CALCULATED.4IONS-SCNC: 8 MMOL/L (CALC) (ref 7–17)
AST SERPL-CCNC: 22 U/L (ref 10–35)
BASOPHILS # BLD AUTO: 83 CELLS/UL (ref 0–200)
BASOPHILS NFR BLD AUTO: 1.1 %
BILIRUB SERPL-MCNC: 0.5 MG/DL (ref 0.2–1.2)
BUN SERPL-MCNC: 23 MG/DL (ref 7–25)
CALCIUM SERPL-MCNC: 9.3 MG/DL (ref 8.6–10.3)
CHLORIDE SERPL-SCNC: 103 MMOL/L (ref 98–110)
CHOLEST SERPL-MCNC: 210 MG/DL
CHOLEST/HDLC SERPL: 5 (CALC)
CO2 SERPL-SCNC: 28 MMOL/L (ref 20–32)
CREAT SERPL-MCNC: 1.44 MG/DL (ref 0.7–1.28)
EGFRCR SERPLBLD CKD-EPI 2021: 51 ML/MIN/1.73M2
EOSINOPHIL # BLD AUTO: 180 CELLS/UL (ref 15–500)
EOSINOPHIL NFR BLD AUTO: 2.4 %
ERYTHROCYTE [DISTWIDTH] IN BLOOD BY AUTOMATED COUNT: 12.5 % (ref 11–15)
GLUCOSE SERPL-MCNC: 98 MG/DL (ref 65–99)
HCT VFR BLD AUTO: 41 % (ref 38.5–50)
HDLC SERPL-MCNC: 42 MG/DL
HGB BLD-MCNC: 13.3 G/DL (ref 13.2–17.1)
LDLC SERPL CALC-MCNC: 125 MG/DL (CALC)
LYMPHOCYTES # BLD AUTO: 1418 CELLS/UL (ref 850–3900)
LYMPHOCYTES NFR BLD AUTO: 18.9 %
MCH RBC QN AUTO: 30.5 PG (ref 27–33)
MCHC RBC AUTO-ENTMCNC: 32.4 G/DL (ref 32–36)
MCV RBC AUTO: 94 FL (ref 80–100)
MONOCYTES # BLD AUTO: 645 CELLS/UL (ref 200–950)
MONOCYTES NFR BLD AUTO: 8.6 %
NEUTROPHILS # BLD AUTO: 5175 CELLS/UL (ref 1500–7800)
NEUTROPHILS NFR BLD AUTO: 69 %
NONHDLC SERPL-MCNC: 168 MG/DL (CALC)
PLATELET # BLD AUTO: 230 THOUSAND/UL (ref 140–400)
PMV BLD REES-ECKER: 12 FL (ref 7.5–12.5)
POTASSIUM SERPL-SCNC: 4.2 MMOL/L (ref 3.5–5.3)
PROT SERPL-MCNC: 6.8 G/DL (ref 6.1–8.1)
PSA SERPL-MCNC: 4.01 NG/ML
RBC # BLD AUTO: 4.36 MILLION/UL (ref 4.2–5.8)
SODIUM SERPL-SCNC: 139 MMOL/L (ref 135–146)
TRIGL SERPL-MCNC: 304 MG/DL
VIT B12 SERPL-MCNC: 330 PG/ML (ref 200–1100)
WBC # BLD AUTO: 7.5 THOUSAND/UL (ref 3.8–10.8)

## 2025-05-28 ENCOUNTER — APPOINTMENT (OUTPATIENT)
Dept: PRIMARY CARE | Facility: CLINIC | Age: 75
End: 2025-05-28
Payer: MEDICARE

## 2025-09-01 DIAGNOSIS — E55.9 VITAMIN D DEFICIENCY: ICD-10-CM

## 2025-09-02 DIAGNOSIS — E55.9 VITAMIN D DEFICIENCY: ICD-10-CM

## 2025-09-02 RX ORDER — ERGOCALCIFEROL 1.25 MG/1
1.25 CAPSULE ORAL
Qty: 12 CAPSULE | Refills: 1 | Status: SHIPPED | OUTPATIENT
Start: 2025-09-02

## 2025-09-02 RX ORDER — ERGOCALCIFEROL 1.25 MG/1
1.25 CAPSULE ORAL
Qty: 12 CAPSULE | Refills: 3 | Status: SHIPPED | OUTPATIENT
Start: 2025-09-02 | End: 2025-09-02 | Stop reason: SDUPTHER

## 2025-10-15 ENCOUNTER — APPOINTMENT (OUTPATIENT)
Dept: PRIMARY CARE | Facility: CLINIC | Age: 75
End: 2025-10-15
Payer: MEDICARE